# Patient Record
Sex: OTHER/UNKNOWN | Race: WHITE | NOT HISPANIC OR LATINO | URBAN - METROPOLITAN AREA
[De-identification: names, ages, dates, MRNs, and addresses within clinical notes are randomized per-mention and may not be internally consistent; named-entity substitution may affect disease eponyms.]

---

## 2023-01-01 ENCOUNTER — NURSING HOME VISIT (OUTPATIENT)
Dept: POST ACUTE CARE | Facility: EXTERNAL LOCATION | Age: 58
End: 2023-01-01
Payer: COMMERCIAL

## 2023-01-01 DIAGNOSIS — C50.912 MALIGNANT NEOPLASM OF LEFT FEMALE BREAST, UNSPECIFIED ESTROGEN RECEPTOR STATUS, UNSPECIFIED SITE OF BREAST (MULTI): ICD-10-CM

## 2023-01-01 DIAGNOSIS — G89.3 PAIN, CANCER: ICD-10-CM

## 2023-01-01 DIAGNOSIS — Z51.81 ANTICOAGULATION MANAGEMENT ENCOUNTER: Primary | ICD-10-CM

## 2023-01-01 DIAGNOSIS — N39.0 UTI (URINARY TRACT INFECTION) DUE TO ENTEROCOCCUS: ICD-10-CM

## 2023-01-01 DIAGNOSIS — F17.200 TOBACCO DEPENDENCE: ICD-10-CM

## 2023-01-01 DIAGNOSIS — Z79.01 ANTICOAGULATION MANAGEMENT ENCOUNTER: Primary | ICD-10-CM

## 2023-01-01 DIAGNOSIS — B95.2 UTI (URINARY TRACT INFECTION) DUE TO ENTEROCOCCUS: ICD-10-CM

## 2023-01-01 DIAGNOSIS — I10 PRIMARY HYPERTENSION: ICD-10-CM

## 2023-01-01 DIAGNOSIS — G93.41 ACUTE METABOLIC ENCEPHALOPATHY: Primary | ICD-10-CM

## 2023-01-01 DIAGNOSIS — R53.81 PHYSICAL DECONDITIONING: ICD-10-CM

## 2023-01-01 LAB
INR IN PPP BY COAGULATION ASSAY: 1.8 (ref 0.9–1.1)
PROTHROMBIN TIME (PT) IN PPP BY COAGULATION ASSAY: 20.8 SEC (ref 9.8–13.4)

## 2023-01-01 PROCEDURE — 99308 SBSQ NF CARE LOW MDM 20: CPT | Performed by: REGISTERED NURSE

## 2023-01-01 PROCEDURE — 99305 1ST NF CARE MODERATE MDM 35: CPT | Performed by: INTERNAL MEDICINE

## 2023-04-11 NOTE — LETTER
Patient: Vanessa Mayfield  : 1965    Encounter Date: 2023    HISTORY & PHYSICAL    Subjective  Chief complaint: Vanessa Mayfield is a 57 y.o. adult who is a acute skilled care patient being seen and evaluated for multiple medical problems.  Patient presents for weakness.    HPI:  Patient is a 57 year old female with history of breast cancer, diabetes, and HTN. Patient was presented to the ED for evaluation of generalized weakness and twitching. Patient states over the past few days she has become generally weak and fatigued. Her legs in particular feel very weak and she cannot walk today because of it. She states she feels like her knees were giving out on her. She has chronic wounds to both lower extremities. Pt was discharged to SNF for further care.         Past Medical History:   Diagnosis Date   • GERD (gastroesophageal reflux disease)    • HLD (hyperlipidemia)    • Hypertension, essential    • Malignant neoplasm of endometrium (CMS/HCC)    • MDD (major depressive disorder)    • Nicotine dependence    • Other conditions influencing health status     Insulin dependent diabetes mellitus without complication   • Personal history of malignant neoplasm of breast     History of malignant neoplasm of left breast   • Personal history of malignant neoplasm of other organs and systems     History of malignant neoplasm of other organ or system   • Personal history of malignant neoplasm of other parts of uterus     History of endometrial cancer   • Personal history of other diseases of the circulatory system     History of hypertension   • Type 2 diabetes mellitus (CMS/HCC)    • Weakness        Past Surgical History:   Procedure Laterality Date   • MR HEAD ANGIO WO IV CONTRAST  2023    MR HEAD ANGIO WO IV CONTRAST 2023 [facility]   • MR NECK ANGIO WO IV CONTRAST  2023    MR NECK ANGIO WO IV CONTRAST 2023 [facility]   • OTHER SURGICAL HISTORY  2020    Hysterectomy       Family History   Problem  Relation Name Age of Onset   • Diabetes Mother     • Hypertension Mother     • Heart disease Mother     • Diabetes Father     • Heart disease Father         Social History     Socioeconomic History   • Marital status: Unknown     Spouse name: Not on file   • Number of children: Not on file   • Years of education: Not on file   • Highest education level: Not on file   Occupational History   • Not on file   Tobacco Use   • Smoking status: Every Day     Types: Cigarettes   • Smokeless tobacco: Not on file   Vaping Use   • Vaping status: Not on file   Substance and Sexual Activity   • Alcohol use: Never   • Drug use: Never   • Sexual activity: Not on file   Other Topics Concern   • Not on file   Social History Narrative   • Not on file     Social Determinants of Health     Financial Resource Strain: Not on file   Food Insecurity: Not on file   Transportation Needs: Not on file   Physical Activity: Not on file   Stress: Not on file   Social Connections: Not on file   Intimate Partner Violence: Not on file   Housing Stability: Not on file       Vital signs: 130/63, 98.9, 103, 18, 98%    Objective  Physical Exam  Constitutional:       Appearance: Normal appearance.   HENT:      Head: Normocephalic and atraumatic.      Nose: Nose normal.      Mouth/Throat:      Mouth: Mucous membranes are moist.      Pharynx: Oropharynx is clear.   Eyes:      Extraocular Movements: Extraocular movements intact.      Pupils: Pupils are equal, round, and reactive to light.   Cardiovascular:      Rate and Rhythm: Normal rate and regular rhythm.   Pulmonary:      Effort: No respiratory distress.      Breath sounds: Normal breath sounds. No wheezing, rhonchi or rales.   Abdominal:      General: Bowel sounds are normal. There is no distension.      Palpations: Abdomen is soft.      Tenderness: There is no abdominal tenderness. There is no guarding.   Musculoskeletal:      Right lower leg: No edema.      Left lower leg: No edema.   Skin:      General: Skin is warm and dry.   Neurological:      Mental Status: Vanessa Mayfield is alert and oriented to person, place, and time. Mental status is at baseline.   Psychiatric:         Mood and Affect: Mood normal.         Behavior: Behavior normal.         Assessment/Plan  Problem List Items Addressed This Visit          Nervous    Acute metabolic encephalopathy - Primary    Pain, cancer       Circulatory    Hypertension       Genitourinary    UTI (urinary tract infection) due to Enterococcus       Other    Breast cancer, left (CMS/HCC)    Physical deconditioning    Tobacco dependence     Medications, treatments, and labs reviewed  Continue medications and treatments as listed in PCC    Scribe Attestation  I, Katarina Villanuevaibsocorro   attest that this documentation has been prepared under the direction and in the presence of Darnell Wang DO.    Provider Attestation - Scribe documentation  All medical record entries made by the Scribe were at my direction and personally dictated by me. I have reviewed the chart and agree that the record accurately reflects my personal performance of the history, physical exam, discussion and plan.    An interactive audio and/or video telecommunication system which permits real time communications between the patient (at the originating site) and provider (at a distant site) was utilized to provide this telehealth service after obtaining verbal consent.  Darnell Wang DO          Electronically Signed By: Darnell Wang DO   5/21/23  8:52 PM

## 2023-04-25 NOTE — PROGRESS NOTES
PROGRESS NOTE    Subjective   Chief complaint: Vanessa Mayfield is a 57 y.o. adult who is a long term care patient being seen and evaluated for abnormal lab    HPI:  HPI INR 1.8 will need dose adjustment, no evidence of bleeding pt denies complaints   Objective   Vital signs:   127/74, 97.8, 78, 18, 97%,   Physical Exam  Constitutional:       General: Vanessa Mayfield is not in acute distress.  Eyes:      Extraocular Movements: Extraocular movements intact.   Pulmonary:      Effort: Pulmonary effort is normal.   Musculoskeletal:      Cervical back: Neck supple.   Neurological:      Mental Status: Vanessa Mayfield is alert.   Psychiatric:         Mood and Affect: Mood normal.         Behavior: Behavior is cooperative.         Assessment/Plan   Problem List Items Addressed This Visit    None  Visit Diagnoses       Anticoagulation management encounter    -  Primary    increase dose to 4mg po qd          Medications, treatments, and labs reviewed  Continue medications and treatments as listed in PCC    Scribe Attestation  Agnieszka CAM Scribe   attest that this documentation has been prepared under the direction and in the presence of SARAI Mcgrath    Provider Attestation - Scribe documentation  All medical record entries made by the Scribe were at my direction and personally dictated by me. I have reviewed the chart and agree that the record accurately reflects my personal performance of the history, physical exam, discussion and plan.   SARAI Mcgrath

## 2023-04-25 NOTE — LETTER
Patient: Vanessa Mayfield  : 1965    Encounter Date: 2023    PROGRESS NOTE    Subjective  Chief complaint: Vanessa Mayfield is a 57 y.o. adult who is a long term care patient being seen and evaluated for abnormal lab    HPI:  HPI INR 1.8 will need dose adjustment, no evidence of bleeding pt denies complaints   Objective  Vital signs:   127/74, 97.8, 78, 18, 97%,   Physical Exam  Constitutional:       General: Vanessa Mayfield is not in acute distress.  Eyes:      Extraocular Movements: Extraocular movements intact.   Pulmonary:      Effort: Pulmonary effort is normal.   Musculoskeletal:      Cervical back: Neck supple.   Neurological:      Mental Status: Vanessa Mayfield is alert.   Psychiatric:         Mood and Affect: Mood normal.         Behavior: Behavior is cooperative.         Assessment/Plan  Problem List Items Addressed This Visit    None  Visit Diagnoses       Anticoagulation management encounter    -  Primary    increase dose to 4mg po qd          Medications, treatments, and labs reviewed  Continue medications and treatments as listed in PCC    Scribe Attestation  IAgnieszka Scribe   attest that this documentation has been prepared under the direction and in the presence of SARAI Mcgrath    Provider Attestation - Scribe documentation  All medical record entries made by the Scribe were at my direction and personally dictated by me. I have reviewed the chart and agree that the record accurately reflects my personal performance of the history, physical exam, discussion and plan.   SARAI Mcgrath        Electronically Signed By: SARAI Mcgrath   23 11:52 AM

## 2023-05-01 NOTE — PROGRESS NOTES
HISTORY & PHYSICAL    Subjective   Chief complaint: Vanessa Mayfield is a 57 y.o. adult who is a acute skilled care patient being seen and evaluated for multiple medical problems.  Patient presents for weakness.    HPI:  Patient is a 57 year old female with history of breast cancer, diabetes, and HTN. Patient was presented to the ED for evaluation of generalized weakness and twitching. Patient states over the past few days she has become generally weak and fatigued. Her legs in particular feel very weak and she cannot walk today because of it. She states she feels like her knees were giving out on her. She has chronic wounds to both lower extremities. Pt was discharged to SNF for further care.         Past Medical History:   Diagnosis Date    GERD (gastroesophageal reflux disease)     HLD (hyperlipidemia)     Hypertension, essential     Malignant neoplasm of endometrium (CMS/HCC)     MDD (major depressive disorder)     Nicotine dependence     Other conditions influencing health status     Insulin dependent diabetes mellitus without complication    Personal history of malignant neoplasm of breast     History of malignant neoplasm of left breast    Personal history of malignant neoplasm of other organs and systems     History of malignant neoplasm of other organ or system    Personal history of malignant neoplasm of other parts of uterus     History of endometrial cancer    Personal history of other diseases of the circulatory system     History of hypertension    Type 2 diabetes mellitus (CMS/HCC)     Weakness        Past Surgical History:   Procedure Laterality Date    MR HEAD ANGIO WO IV CONTRAST  5/1/2023    MR HEAD ANGIO WO IV CONTRAST 5/1/2023 [facility]    MR NECK ANGIO WO IV CONTRAST  5/1/2023    MR NECK ANGIO WO IV CONTRAST 5/1/2023 [facility] MRI    OTHER SURGICAL HISTORY  11/09/2020    Hysterectomy       Family History   Problem Relation Name Age of Onset    Diabetes Mother      Hypertension Mother      Heart disease  Mother      Diabetes Father      Heart disease Father         Social History     Socioeconomic History    Marital status: Unknown     Spouse name: Not on file    Number of children: Not on file    Years of education: Not on file    Highest education level: Not on file   Occupational History    Not on file   Tobacco Use    Smoking status: Every Day     Types: Cigarettes    Smokeless tobacco: Not on file   Vaping Use    Vaping status: Not on file   Substance and Sexual Activity    Alcohol use: Never    Drug use: Never    Sexual activity: Not on file   Other Topics Concern    Not on file   Social History Narrative    Not on file     Social Determinants of Health     Financial Resource Strain: Not on file   Food Insecurity: Not on file   Transportation Needs: Not on file   Physical Activity: Not on file   Stress: Not on file   Social Connections: Not on file   Intimate Partner Violence: Not on file   Housing Stability: Not on file       Vital signs: 130/63, 98.9, 103, 18, 98%    Objective   Physical Exam  Constitutional:       Appearance: Normal appearance.   HENT:      Head: Normocephalic and atraumatic.      Nose: Nose normal.      Mouth/Throat:      Mouth: Mucous membranes are moist.      Pharynx: Oropharynx is clear.   Eyes:      Extraocular Movements: Extraocular movements intact.      Pupils: Pupils are equal, round, and reactive to light.   Cardiovascular:      Rate and Rhythm: Normal rate and regular rhythm.   Pulmonary:      Effort: No respiratory distress.      Breath sounds: Normal breath sounds. No wheezing, rhonchi or rales.   Abdominal:      General: Bowel sounds are normal. There is no distension.      Palpations: Abdomen is soft.      Tenderness: There is no abdominal tenderness. There is no guarding.   Musculoskeletal:      Right lower leg: No edema.      Left lower leg: No edema.   Skin:     General: Skin is warm and dry.   Neurological:      Mental Status: Vanessa Mayfield is alert and oriented to  person, place, and time. Mental status is at baseline.   Psychiatric:         Mood and Affect: Mood normal.         Behavior: Behavior normal.         Assessment/Plan   Problem List Items Addressed This Visit          Nervous    Acute metabolic encephalopathy - Primary    Pain, cancer       Circulatory    Hypertension       Genitourinary    UTI (urinary tract infection) due to Enterococcus       Other    Breast cancer, left (CMS/HCC)    Physical deconditioning    Tobacco dependence     Medications, treatments, and labs reviewed  Continue medications and treatments as listed in PCC    Scribe Attestation  IAmelia Scribe   attest that this documentation has been prepared under the direction and in the presence of Darnell Wang DO.    Provider Attestation - Scribe documentation  All medical record entries made by the Scribe were at my direction and personally dictated by me. I have reviewed the chart and agree that the record accurately reflects my personal performance of the history, physical exam, discussion and plan.    An interactive audio and/or video telecommunication system which permits real time communications between the patient (at the originating site) and provider (at a distant site) was utilized to provide this telehealth service after obtaining verbal consent.  Darnell Wang DO         yes

## 2023-05-21 PROBLEM — C50.912 BREAST CANCER, LEFT (MULTI): Status: ACTIVE | Noted: 2023-05-21

## 2023-05-21 PROBLEM — G89.3 PAIN, CANCER: Status: ACTIVE | Noted: 2023-05-21

## 2023-05-21 PROBLEM — G93.41 ACUTE METABOLIC ENCEPHALOPATHY: Status: ACTIVE | Noted: 2023-05-21

## 2023-05-21 PROBLEM — B95.2 UTI (URINARY TRACT INFECTION) DUE TO ENTEROCOCCUS: Status: ACTIVE | Noted: 2023-05-21

## 2023-05-21 PROBLEM — I10 HYPERTENSION: Status: ACTIVE | Noted: 2023-05-21

## 2023-05-21 PROBLEM — R53.81 PHYSICAL DECONDITIONING: Status: ACTIVE | Noted: 2023-05-21

## 2023-05-21 PROBLEM — N39.0 UTI (URINARY TRACT INFECTION) DUE TO ENTEROCOCCUS: Status: ACTIVE | Noted: 2023-05-21

## 2023-05-21 PROBLEM — F17.200 TOBACCO DEPENDENCE: Status: ACTIVE | Noted: 2023-05-21

## 2023-05-21 PROBLEM — R41.82 ALTERED MENTAL STATUS: Status: ACTIVE | Noted: 2023-05-21
